# Patient Record
Sex: FEMALE | Race: WHITE
[De-identification: names, ages, dates, MRNs, and addresses within clinical notes are randomized per-mention and may not be internally consistent; named-entity substitution may affect disease eponyms.]

---

## 2018-07-08 ENCOUNTER — HOSPITAL ENCOUNTER (INPATIENT)
Dept: HOSPITAL 96 - M.ERS | Age: 78
LOS: 2 days | Discharge: HOME | DRG: 287 | End: 2018-07-10
Attending: FAMILY MEDICINE | Admitting: FAMILY MEDICINE
Payer: COMMERCIAL

## 2018-07-08 VITALS — DIASTOLIC BLOOD PRESSURE: 61 MMHG | SYSTOLIC BLOOD PRESSURE: 133 MMHG

## 2018-07-08 VITALS — DIASTOLIC BLOOD PRESSURE: 64 MMHG | SYSTOLIC BLOOD PRESSURE: 120 MMHG

## 2018-07-08 VITALS — DIASTOLIC BLOOD PRESSURE: 69 MMHG | SYSTOLIC BLOOD PRESSURE: 133 MMHG

## 2018-07-08 VITALS — SYSTOLIC BLOOD PRESSURE: 105 MMHG | DIASTOLIC BLOOD PRESSURE: 46 MMHG

## 2018-07-08 VITALS — DIASTOLIC BLOOD PRESSURE: 62 MMHG | SYSTOLIC BLOOD PRESSURE: 128 MMHG

## 2018-07-08 VITALS — WEIGHT: 187 LBS | BODY MASS INDEX: 36.71 KG/M2 | HEIGHT: 60 IN

## 2018-07-08 DIAGNOSIS — Z90.89: ICD-10-CM

## 2018-07-08 DIAGNOSIS — Z82.49: ICD-10-CM

## 2018-07-08 DIAGNOSIS — F17.210: ICD-10-CM

## 2018-07-08 DIAGNOSIS — Z79.899: ICD-10-CM

## 2018-07-08 DIAGNOSIS — Z90.710: ICD-10-CM

## 2018-07-08 DIAGNOSIS — E66.9: ICD-10-CM

## 2018-07-08 DIAGNOSIS — Z88.1: ICD-10-CM

## 2018-07-08 DIAGNOSIS — Z88.2: ICD-10-CM

## 2018-07-08 DIAGNOSIS — Z95.5: ICD-10-CM

## 2018-07-08 DIAGNOSIS — Z91.030: ICD-10-CM

## 2018-07-08 DIAGNOSIS — I25.82: ICD-10-CM

## 2018-07-08 DIAGNOSIS — Z95.1: ICD-10-CM

## 2018-07-08 DIAGNOSIS — I10: ICD-10-CM

## 2018-07-08 DIAGNOSIS — E78.5: ICD-10-CM

## 2018-07-08 DIAGNOSIS — I25.2: ICD-10-CM

## 2018-07-08 DIAGNOSIS — I50.32: ICD-10-CM

## 2018-07-08 DIAGNOSIS — E11.9: ICD-10-CM

## 2018-07-08 DIAGNOSIS — M81.0: ICD-10-CM

## 2018-07-08 DIAGNOSIS — I25.110: Primary | ICD-10-CM

## 2018-07-08 DIAGNOSIS — Z91.048: ICD-10-CM

## 2018-07-08 DIAGNOSIS — J44.9: ICD-10-CM

## 2018-07-08 LAB
ABSOLUTE BASOPHILS: 0.2 THOU/UL (ref 0–0.2)
ABSOLUTE EOSINOPHILS: 0.1 THOU/UL (ref 0–0.7)
ABSOLUTE MONOCYTES: 0.9 THOU/UL (ref 0–1.2)
ALBUMIN SERPL-MCNC: 3.2 G/DL (ref 3.4–5)
ALP SERPL-CCNC: 76 U/L (ref 46–116)
ALT SERPL-CCNC: 27 U/L (ref 30–65)
ANION GAP SERPL CALC-SCNC: 5 MMOL/L (ref 7–16)
ANION GAP SERPL CALC-SCNC: 9 MMOL/L (ref 7–16)
AST SERPL-CCNC: 16 U/L (ref 15–37)
BASOPHILS NFR BLD AUTO: 1.4 %
BILIRUB SERPL-MCNC: 0.3 MG/DL
BUN SERPL-MCNC: 13 MG/DL (ref 7–18)
BUN SERPL-MCNC: 16 MG/DL (ref 7–18)
CALCIUM SERPL-MCNC: 9 MG/DL (ref 8.5–10.1)
CALCIUM SERPL-MCNC: 9.2 MG/DL (ref 8.5–10.1)
CHLORIDE SERPL-SCNC: 100 MMOL/L (ref 98–107)
CHLORIDE SERPL-SCNC: 102 MMOL/L (ref 98–107)
CO2 SERPL-SCNC: 28 MMOL/L (ref 21–32)
CO2 SERPL-SCNC: 32 MMOL/L (ref 21–32)
CREAT SERPL-MCNC: 0.9 MG/DL (ref 0.6–1.3)
CREAT SERPL-MCNC: 1.1 MG/DL (ref 0.6–1.3)
EOSINOPHIL NFR BLD: 0.6 %
GLUCOSE SERPL-MCNC: 126 MG/DL (ref 70–99)
GLUCOSE SERPL-MCNC: 186 MG/DL (ref 70–99)
GRANULOCYTES NFR BLD MANUAL: 66.6 %
HCT VFR BLD CALC: 43.2 % (ref 37–47)
HGB BLD-MCNC: 14.4 GM/DL (ref 12–15)
LIPASE: 145 U/L (ref 73–393)
LYMPHOCYTES # BLD: 3 THOU/UL (ref 0.8–5.3)
LYMPHOCYTES NFR BLD AUTO: 24.4 %
MAGNESIUM SERPL-MCNC: 1.7 MG/DL (ref 1.8–2.4)
MAGNESIUM SERPL-MCNC: 1.8 MG/DL (ref 1.8–2.4)
MCH RBC QN AUTO: 29.4 PG (ref 26–34)
MCHC RBC AUTO-ENTMCNC: 33.4 G/DL (ref 28–37)
MCV RBC: 88.3 FL (ref 80–100)
MONOCYTES NFR BLD: 7 %
MPV: 8.1 FL. (ref 7.2–11.1)
NEUTROPHILS # BLD: 8.2 THOU/UL (ref 1.6–8.1)
NT-PRO BRAIN NAT PEPTIDE: 144 PG/ML (ref ?–300)
NUCLEATED RBCS: 0 /100WBC
PLATELET COUNT*: 329 THOU/UL (ref 150–400)
POTASSIUM SERPL-SCNC: 3.3 MMOL/L (ref 3.5–5.1)
POTASSIUM SERPL-SCNC: 3.7 MMOL/L (ref 3.5–5.1)
PROT SERPL-MCNC: 6.7 G/DL (ref 6.4–8.2)
RBC # BLD AUTO: 4.89 MIL/UL (ref 4.2–5)
RDW-CV: 15 % (ref 10.5–14.5)
SODIUM SERPL-SCNC: 137 MMOL/L (ref 136–145)
SODIUM SERPL-SCNC: 139 MMOL/L (ref 136–145)
TROPONIN-I LEVEL: <0.06 NG/ML (ref ?–0.06)
WBC # BLD AUTO: 12.4 THOU/UL (ref 4–11)

## 2018-07-09 VITALS — DIASTOLIC BLOOD PRESSURE: 52 MMHG | SYSTOLIC BLOOD PRESSURE: 106 MMHG

## 2018-07-09 VITALS — SYSTOLIC BLOOD PRESSURE: 112 MMHG | DIASTOLIC BLOOD PRESSURE: 56 MMHG

## 2018-07-09 VITALS — DIASTOLIC BLOOD PRESSURE: 55 MMHG | SYSTOLIC BLOOD PRESSURE: 91 MMHG

## 2018-07-09 VITALS — SYSTOLIC BLOOD PRESSURE: 91 MMHG | DIASTOLIC BLOOD PRESSURE: 55 MMHG

## 2018-07-09 VITALS — SYSTOLIC BLOOD PRESSURE: 124 MMHG | DIASTOLIC BLOOD PRESSURE: 57 MMHG

## 2018-07-09 VITALS — DIASTOLIC BLOOD PRESSURE: 57 MMHG | SYSTOLIC BLOOD PRESSURE: 106 MMHG

## 2018-07-09 VITALS — DIASTOLIC BLOOD PRESSURE: 52 MMHG | SYSTOLIC BLOOD PRESSURE: 108 MMHG

## 2018-07-09 VITALS — SYSTOLIC BLOOD PRESSURE: 95 MMHG | DIASTOLIC BLOOD PRESSURE: 47 MMHG

## 2018-07-09 VITALS — SYSTOLIC BLOOD PRESSURE: 82 MMHG | DIASTOLIC BLOOD PRESSURE: 48 MMHG

## 2018-07-09 VITALS — DIASTOLIC BLOOD PRESSURE: 54 MMHG | SYSTOLIC BLOOD PRESSURE: 102 MMHG

## 2018-07-09 VITALS — DIASTOLIC BLOOD PRESSURE: 48 MMHG | SYSTOLIC BLOOD PRESSURE: 92 MMHG

## 2018-07-09 VITALS — SYSTOLIC BLOOD PRESSURE: 92 MMHG | DIASTOLIC BLOOD PRESSURE: 48 MMHG

## 2018-07-09 VITALS — DIASTOLIC BLOOD PRESSURE: 114 MMHG | SYSTOLIC BLOOD PRESSURE: 127 MMHG

## 2018-07-09 VITALS — DIASTOLIC BLOOD PRESSURE: 56 MMHG | SYSTOLIC BLOOD PRESSURE: 112 MMHG

## 2018-07-09 PROCEDURE — B2111ZZ FLUOROSCOPY OF MULTIPLE CORONARY ARTERIES USING LOW OSMOLAR CONTRAST: ICD-10-PCS | Performed by: INTERNAL MEDICINE

## 2018-07-09 PROCEDURE — 4A023N7 MEASUREMENT OF CARDIAC SAMPLING AND PRESSURE, LEFT HEART, PERCUTANEOUS APPROACH: ICD-10-PCS | Performed by: INTERNAL MEDICINE

## 2018-07-09 PROCEDURE — B2181ZZ FLUOROSCOPY OF LEFT INTERNAL MAMMARY BYPASS GRAFT USING LOW OSMOLAR CONTRAST: ICD-10-PCS | Performed by: INTERNAL MEDICINE

## 2018-07-09 PROCEDURE — B41F1ZZ FLUOROSCOPY OF RIGHT LOWER EXTREMITY ARTERIES USING LOW OSMOLAR CONTRAST: ICD-10-PCS | Performed by: INTERNAL MEDICINE

## 2018-07-09 NOTE — CON
[address]  [address]                    CONSULTATION                  
_______________________________________________________________________________
 
Name:       BIENVENIDO MOURA              Room:           [address] [place]  
..#:  [id number]      Account #:      [id number]  
Admission:  07/08/18     Attend Phys:    Maurice Valencia, 
Discharge:               Date of Birth:  [birth year]  
         Report #: [id number]
                                                                     [id number]  
_______________________________________________________________________________
THIS REPORT FOR:  //name//                      
 
CC: Jessica Shah
    Patient's Chart 
    Maurice Valencia
 
DATE OF SERVICE:  07/08/2018
 
 
CARDIOLOGY CONSULTATION
 
HISTORY OF PRESENT ILLNESS:  The patient is a 78-year-old single white female
who I was asked to see in the hospital after she complained of chest pain.  The
patient has an extensive past medical history.  She had 2-vessel bypass surgery
at [facility] when she is 55 years old.  Recently, she has had
multiple stents.  Her first stent was placed in 2012 by Dr. Robledo.  At the time
of her bypass, she had a LIMA graft to the LAD and a vein graft to the diagonal
for a bifurcation lesion in the LAD.  In 2012, Dr. Robledo placed 2 drug-eluting
stents in the proximal circumflex artery.  In 2017 in October, Dr. Dwyer
placed 2 drug-eluting stents in the native right coronary artery.  She did well
until recently.  She is now having frequent episodes of chest tightness.  It is
not necessarily related to exertion or meals.  She describes a discomfort in her
chest, it goes into her arms, make her short of breath, nauseated.  She takes
nitroglycerin that seems to help, but the pain comes back.  Today, she had an
episode of chest pain and she came to the Emergency Room for further evaluation
and treatment.  She denied any recent vomiting or diarrhea.  She has had no
recent bleeding.  Denied any fever or cough.  She did have a chill recently. 
She does get short of breath if she exerts herself, although she is not very
active.  She denied any palpitation or syncope.
 
PAST MEDICAL AND SURGICAL HISTORY:  Significant for removal of ovarian cyst,
hysterectomy, hypertension, hyperlipidemia.
 
MEDICATIONS:  On admission consisted of atenolol, Isordil, losartan, acyclovir,
Lipitor, Plavix.
 
ALLERGIES:  SHE HAS INTOLERANCE TO LOSARTAN, HYDROCODONE, AND HYDRALAZINE.
 
FAMILY HISTORY:  Her mother has heart disease.
 
SOCIAL HISTORY:  She is , lives in [place] by herself.  She smokes a
pack of cigarettes a day.  No alcohol abuse.
 
REVIEW OF SYSTEMS:  She has had no history of stroke.  She does have recurrent
shingles.  She has COPD.  She is on nebulizer.  No history of peptic ulcer
disease, liver disease, kidney disease or cancer.  She has no history of
 
 
 
[place] [address]                    CONSULTATION                  
_______________________________________________________________________________
 
Name:       BIENVENIDO MOURA              Room:           [address]#:  [id number]      Account #:      [id number]  
Admission:  07/08/18     Attend Phys:    Maurice Valencia, 
Discharge:               Date of Birth:  [birth year]  
         Report #: [id number]
                                                                     [id number]  
_______________________________________________________________________________
psychiatric illness.
 
PHYSICAL EXAMINATION:
GENERAL:  Revealed an elderly female who appeared in no acute distress.
VITAL SIGNS:  She had a blood pressure of 110/60, pulse 60.  She is afebrile.
HEENT:  She was anicteric, conjunctiva pink.  Mucous members moist.
NECK:  Veins difficult to assess.  No carotid bruits.
CHEST:  Revealed expiratory wheezes.
CARDIAC EXAMINATION:  Regular rate and rhythm.
ABDOMEN:  Obese, soft, nontender.
EXTREMITIES:  Had no edema.  Dorsalis pedis pulse cannot be palpated.
SKIN:  Cool and dry.
NEUROLOGIC:  Nonfocal.
 
LABORATORY DATA:  Her ECG showed a sinus rhythm with a left bundle branch block.
 Workup in the Emergency Room, she had sodium 139, creatinine 0.9.  Liver
function studies were normal.  Troponin 0.06.  White blood cell count 12.4,
hemoglobin 14.4.  She had a chest x-ray in the Emergency Room that showed no
acute abnormality.
 
IMPRESSION AND RECOMMENDATIONS:
1.  Unstable angina.  Recommend repeat cardiac catheterization.
2.  Hypertension.  The patient is on a beta blocker, ARB and diuretic.
3.  Hyperlipidemia.  The patient is on a statin drug.
4.  Chronic obstructive pulmonary disease.
5.  Tobacco abuse.
6.  Obesity.
7.  History of recurrent shingles.
 
 
 
 
 
 
 
 
 
 
 
 
 
 
 
 
<ELECTRONICALLY SIGNED>
                                        By:  Cam Marie MD, FACC      
07/09/18     0719
D: 07/08/18 1322_______________________________________
T: 07/08/18 1959Cam Marie MD, FACC         /nt

## 2018-07-09 NOTE — 2DMMODE
[place] [address]
Phone:  [phone] 2 D/M-MODE ECHOCARDIOGRAM     
_______________________________________________________________________________
 
Name:         BIENVENIDO MOURA             Room:          [address] [place] 
[facility]#:    [id number]     Account #:     [id number]  
Admission:    18    Attend Phys:   Maurice Wray
Discharge:                Date of Birth: [birth year]  
Date of Service: 18 1706  Report #:      [id number]
        [id number]
_______________________________________________________________________________
THIS REPORT FOR:  //name//                      
 
 
--------------- APPROVED REPORT --------------
 
 
Study performed:  2018 14:52:53
 
EXAM: Comprehensive 2D, Doppler, and color-flow 
Echocardiogram 
Patient Location: In-Patient   
Room #:  [facility]     Status:  routine
 
      BSA:         1.76
HR: 54 bpm BP:          106/57 mmHg 
Rhythm: NSR     
 
Other Information 
Study Quality: Good
 
Indications
Chest Pain
 
2D Dimensions
   LVEF(%):  70.64 (&gt;50%)
IVSd:  11.87 (7-11mm) LVOT Diam:  17.39 (18-24mm) 
LVDd:  46.13 mm  
PWd:  8.75 (7-11mm) Ascending Ao:  31.23 (22-36mm)
LVDs:  27.70 (25-40mm) 
Aortic Root:  30.93 mm 
   Castro's LVEF:  70.64 %
 
Volumes
Left Atrial Volume (Systole) 
    LA ESV Index:  25.20 mL/m2
 
Aortic Valve
AoV Peak Daniel.:  1.53 m/s 
AO Peak Gr.:  9.41 mmHg  LVOT Max P.43 mmHg
AO Mean Gr.:  4.98 mmHg  LVOT Mean PG:  3.07 mmHg
    LVOT Max V:  1.27 m/s
AO V2 VTI:  34.63 cm  LVOT Mean V:  0.81 m/s
MARIBELL (VTI):  2.03 cm2  LVOT V1 VTI:  29.59 cm
 
Mitral Valve
    E/A Ratio:  0.86
 
 
[place] [address]
Phone:  [phone]                     2 D/M-MODE ECHOCARDIOGRAM     
_______________________________________________________________________________
 
Name:         BIENVENIDO MOURA             Room:          [address] [place] 
.R.#:    [id number]     Account #:     [id number]  
Admission:    18    Attend Phys:   Maurice Wray
Discharge:                Date of Birth: [birth year]  
Date of Service: 18 1706  Report #:      [id number]
        [id number]
_______________________________________________________________________________
    MV Decel. Time:  301.47 ms
MV E Max Daniel.:  0.82 m/s 
MV PHT:  87.43 ms  
MVA (PHT):  2.52 cm2  
 
TDI
E/Lateral E':  11.71 E/Medial E':  10.25
   Medial E' Daniel.:  0.08 m/s
   Lateral E' Daniel.:  0.07 m/s
 
Pulmonary Valve
PV Peak Daniel.:  0.95 m/s PV Peak Gr.:  3.57 mmHg
 
Tricuspid Valve
    RAP Estimate:  5.00 mmHg
TR Peak Gr.:  18.51 mmHg RVSP:  23.51 mmHg
    PA Pressure:  23.51 mmHg
 
Left Ventricle
The left ventricle is normal size. There is normal LV segmental wall 
motion. There is normal left ventricular wall thickness. Left 
ventricular systolic function is normal. The left ventricular 
ejection fraction is within the normal range. LVEF is 55-60%. Grade I 
- abnormal relaxation pattern.
 
Right Ventricle
The right ventricle is normal size. The right ventricular systolic 
function is normal.
 
Atria
The left atrium size is normal. The right atrium size is 
normal.
 
Aortic Valve
Mild aortic valve sclerosis. No aortic regurgitation is present. 
There is no aortic valvular stenosis.
 
Mitral Valve
There is mitral annular calcification. Trace mitral regurgitation. No 
evidence of mitral valve stenosis.
 
Tricuspid Valve
The tricuspid valve is normal in structure. Mild tricuspid 
regurgitation. No pulmonary hypertension.
 
Pulmonic Valve
 
 
[place] [address]
Phone:  [phone]                     2 D/M-MODE ECHOCARDIOGRAM     
_______________________________________________________________________________
 
Name:         BIENVENIDO MOURA             Room:          [address] [place] 
[facility]#:    [id number]     Account #:     [id number]  
Admission:    18    Attend Phys:   Maurice Wray
Discharge:                Date of Birth: [birth year]  
Date of Service: 18 1706  Report #:      [id number]
        [id number]
_______________________________________________________________________________
The pulmonary valve is normal in structure. Trace pulmonic 
regurgitation.
 
Great Vessels
The aortic root is normal in size. IVC is normal in size and 
collapses with &gt;50% inspiration
 
Pericardium
There is no pericardial effusion.
 
&lt;Conclusion&gt;
The left ventricle is normal size.
There is normal left ventricular wall thickness.
Left ventricular systolic function is normal.
The left ventricular ejection fraction is within the normal 
range.
LVEF is 55-60%.
Grade I - abnormal relaxation pattern.
The right ventricle is normal size.
The left atrium size is normal.
Mild aortic valve sclerosis.
No aortic regurgitation is present.
There is no aortic valvular stenosis.
There is mitral annular calcification.
Trace mitral regurgitation.
The tricuspid valve is normal in structure.
Mild tricuspid regurgitation.
IVC is normal in size and collapses with &gt;50% inspiration
There is no pericardial effusion.
There is normal LV segmental wall motion.
 
 
 
 
 
 
 
 
 
 
 
 
 
 
  <ELECTRONICALLY SIGNED>
                                           By: Juice Dwyer MD, FACC     
  18     1706
D: 18   _____________________________________
T: 18   Juice Dwyer MD, FACC       /INF

## 2018-07-10 VITALS — DIASTOLIC BLOOD PRESSURE: 71 MMHG | SYSTOLIC BLOOD PRESSURE: 130 MMHG

## 2018-07-10 VITALS — DIASTOLIC BLOOD PRESSURE: 62 MMHG | SYSTOLIC BLOOD PRESSURE: 116 MMHG

## 2018-07-10 LAB
ANION GAP SERPL CALC-SCNC: 7 MMOL/L (ref 7–16)
BUN SERPL-MCNC: 18 MG/DL (ref 7–18)
CALCIUM SERPL-MCNC: 7.9 MG/DL (ref 8.5–10.1)
CHLORIDE SERPL-SCNC: 108 MMOL/L (ref 98–107)
CHOLEST SERPL-MCNC: 128 MG/DL (ref ?–200)
CO2 SERPL-SCNC: 25 MMOL/L (ref 21–32)
CREAT SERPL-MCNC: 0.7 MG/DL (ref 0.6–1.3)
GLUCOSE SERPL-MCNC: 92 MG/DL (ref 70–99)
HDLC SERPL-MCNC: 30 MG/DL (ref 40–?)
LDLC SERPL-MCNC: 63 MG/DL (ref ?–100)
POTASSIUM SERPL-SCNC: 3.8 MMOL/L (ref 3.5–5.1)
SODIUM SERPL-SCNC: 140 MMOL/L (ref 136–145)
TC:HDL: 4.3 RATIO
TRIGL SERPL-MCNC: 179 MG/DL (ref ?–150)
VLDLC SERPL CALC-MCNC: 36 MG/DL (ref ?–40)

## 2018-07-10 NOTE — CARD
[address]  [address]                    CARDIAC CATH REPORT           
_______________________________________________________________________________
 
Name:       BIENVENIDO MOURA              Room:           [address] [place]  
[facility]#:  [id number]      Account #:      [id number]  
Admission:  07/08/18     Attend Phys:    Maurice Valencia, 
Discharge:               Date of Birth:  [birth year]  
         Report #: [id number]
                                                                     [id number]
_______________________________________________________________________________
THIS REPORT FOR:  //name//                      
 
 
--------------- APPROVED REPORT --------------
 
 
Study performed:  07/09/2018 16:39:03
 
Patient Details
Patient Status: In-Patient                  Room #: 221
The patient is a 78 year-old female
 
Event Personnel
Juice Dwyer  Cardiologist, Lizeth Marsh RN Circulator, 
Evelyne Herrera  Monitor, Elder Gunter Haley, Jessica RTDEDRICK 
Monitor
 
Procedures Performed
Left heart catheterization selective coronary artery and LIMA graft 
study
 
Indication
Chest pain
 
Risk Factors
Obesity, Chronic Lung DiseaseHypercholesterolemia, Hypertension, 
Diabetes 
 
Previous Procedures/Diagnoses
Previous CABGPrevious PCI
 
Procedure Narrative
The patient was brought electively to the Cardiac Catheterization 
Laboratory and was prepped and draped in a sterile manner. The right 
femoral was infiltrated with 2% Lidocaine subcutaneous anesthesia. A 
6fr Ultimum Sheath sheath was inserted into the right femoral artery. 
Coronary angiography was performed using coronary diagnostic 
catheters. The right coronary system was accessed and visualized with 
a 6fr JR 4 catheter. The left coronary system was accessed and 
visualized with a 6fr JL 4 catheter. The left ventricle was accessed 
and visualized with a 6fr pigtasil catheter. Left ventricular/Aortic 
Valve gradient assessed via catheter pullback. Pre-demployment 
femoral angiogram was performed . Closure device was deployed with a 
6 Fr MynxGrip 6/7F. There was no hematoma.
 
Intraoperative Conscious Sedation
 
 
 
[place] [address]                    CARDIAC CATH REPORT           
_______________________________________________________________________________
 
Name:       BIENVENIDO MOURA              Room:           [address] [place]  
[facility]#:  [id number]      Account #:      [id number]  
Admission:  07/08/18     Attend Phys:    Maurice Valencia, 
Discharge:               Date of Birth:  [birth year]  
         Report #: [id number]
                                                                     [id number]
_______________________________________________________________________________
Sedation start time:  17:10           Case end Time:  
17:30    
Fentanyl  25 mcg    Versed  2 mg  
 
Fluoro Time:    3.7 minutes     
Dose:     DAP 57319 cGycm2  568.75 mGy  
Contrast Type and Amount:  Visipaque 135 ml    
 
Coronary Angiography
The patient's coronary anatomy is right dominant. 
 
Native Artery Percent Stenosis
Grafts (Complete if Previous CABG=Yes: Percent Stenosis)
#1 widely patent LIMA graft to the LAD with 50% distal LAD 
narrowing
 
Diagnostic Cath
Left Main 0% narrowing
LAD  100% proximal occlusion
Circumflex 40% mid circumflex narrowing, this being a nondominant 
vessel
Right Coronary Large dominant right coronary artery with widely 
patent mid right coronary and posterolateral branch stents. There is 
atheromatous debris in the distal right coronary artery without flow 
limitation
 
Left Ventriculography
Left Ventriculography was not performed.     
 
Hemodynamics
The aortic pressure is 131/69 mmHg with a mean of 90 mmHg. The left 
ventricular pressure is 128/6 mmHg with a mean of mmHg. The left 
ventricular end diastolic pressure is 15 mmHg. There was no gradient 
across the aortic valve upon pullback. 
 
Conclusion
#1 severe atherosclerotic coronary artery disease characterized by 
the following:
 
A 100% proximal LAD occlusion
 
B 40% narrowing of the midportion of the nondominant circumflex
 
C large dominant right coronary artery with widely patent mid and 
posterolateral branch stents with atheromatous debris in the distal 
right coronary artery which was nonflow limiting
 
 
 
[place] [address]                    CARDIAC CATH REPORT           
_______________________________________________________________________________
 
Name:       BIENVENIDO MOURA              Room:           [address] [place]  
..#:  [id number]      Account #:      [id number]  
Admission:  07/08/18     Attend Phys:    Maurice Valencia, 
Discharge:               Date of Birth:  [birth year]  
         Report #: [id number]
                                                                     [id number]
_______________________________________________________________________________
 
#2 widely patent LIMA graft to the LAD with 50% distal LAD 
narrowing
 
#3 mild elevation of left ventricular end-diastolic pressure at rest 
 
Recommendations
Smoking Cessation
Cardiac Risk Reduction Program
Aggressive Medical Therapy
 
Diagnostic Cath Approved by: Juice Dwyer MD        Date/Time: 
7/10/18 and 1133 hrs.
 
 
 
 
 
 
 
 
 
 
 
 
 
 
 
 
 
 
 
 
 
 
 
 
 
 
 
 
 
 
 
<ELECTRONICALLY SIGNED>
                                        By:  Juice Dwyer MD, [facility]     
07/10/18     1134
D: 07/10/18 1134_______________________________________
T: 07/10/18 1134John DYLLAN Dwyer MD, FACC        /INF

## 2018-07-10 NOTE — EKG
[place] [address]
Phone:  [phone]                     ELECTROCARDIOGRAM REPORT      
_______________________________________________________________________________
 
Name:       BIENVENIDO MOURA              Room:           M.[address]    [place]  
M.R.#:  [id number]      Account #:      [id number]  
Admission:  18     Attend Phys:    Maurice Valencia, 
Discharge:  07/10/18     Date of Birth:  [birth year]  
         Report #: [id number]
    [id number]
_______________________________________________________________________________
THIS REPORT FOR:  //name//                      
 
                          [facility]
                                       
Test Date:    2018               Test Time:    08:55:44
Pat Name:     BIENVENIDO ALARCONINLEY          Department:   
Patient ID:   [id number]            Room:          
Gender:                               Technician:   ANGELINE ROACH
:          1940               Requested By: Gabriel Thacker
Order Number: [id number] MD:   Ricardo Rizo
                                 Measurements
Intervals                              Axis          
Rate:         72                       P:            7
IL:           177                      QRS:          -19
QRSD:         155                      T:            138
QT:           437                                    
QTc:          479                                    
                           Interpretive Statements
Sinus rhythm
Left bundle branch block
Compared to ECG 2017 10:35:25
No significant changes
 
Electronically Signed On 7- 13:27:56 CDT by Ricardo Rizo
[email or web address]
 
 
 
 
 
 
 
 
 
 
 
 
 
 
 
 
 
 
  <ELECTRONICALLY SIGNED>
                                           By: Ricardo Rizo MD, [facility]   
  07/10/18     1327
D: 18 0855   _____________________________________
T: 18 0855   Ricardo Rizo MD, [facility]     /EPI